# Patient Record
Sex: FEMALE
[De-identification: names, ages, dates, MRNs, and addresses within clinical notes are randomized per-mention and may not be internally consistent; named-entity substitution may affect disease eponyms.]

---

## 2018-07-27 ENCOUNTER — HOSPITAL ENCOUNTER (EMERGENCY)
Dept: HOSPITAL 14 - H.EROB2 | Age: 28
Discharge: HOME | End: 2018-07-27
Payer: MEDICAID

## 2018-07-27 VITALS — BODY MASS INDEX: 24.7 KG/M2

## 2018-07-27 DIAGNOSIS — O47.1: ICD-10-CM

## 2018-07-27 DIAGNOSIS — Z3A.38: ICD-10-CM

## 2018-07-27 DIAGNOSIS — O26.853: Primary | ICD-10-CM

## 2018-07-27 NOTE — OBHP
===========================

Datetime: 2018 19:51

===========================

   

IP Adm Impression:  Term, intrauterine pregnancy

IP Admit Plan:  Observation/Evaluation; Discharge home

Admit Comment, IP Provider:  27 y/o  @ 38.2 weeks GA presents for evaluation of vaginal bleeding.
 Pt had pelvic exam done yesterday at [facility] and has been having spotting since. She reports 
she was told her was 2cm dilated at the clinic yesterday. She reports only seeing scant blood upon wi
ping after urination. Denies CTX, gush of fluid, passing profuse blood/clots, urinary symptoms, and r
eports good DM.

      

   ROS: as per HPI, otherwise negative

      

   PE: as above

   PMD: [facility]

   PObhx: 2 , 1 MC, 1 SAB

   Prenatal course: GBS+, fetal kidney abnormality

   ALL: NKDA

   SurgHx: negative

   SocialHx: denies ETOH/Tobacco/drug abuse

      

   A: 27 y/o  @ 38.2 weeks GA complaining of vaginal spotting

   P: speculum exam no signs of active bleeding, no clots. cervix closed, no CTX, pelvic exam as abov
e, discharge home.

   Case discussed with attending

   Lukas Akbar MD PGY3

      

   The patient was seen with the resident I agree with the note

Pelvic Type - PN:  Adequate

Extremities - PN:  Normal

Abdomen - PN:  Normal

Back - PN:  Normal

Breast - PN:  Normal

Lungs - PN:  Normal

Heart - PN:  Normal

Thyroid - PN:  Normal

Neurologic - PN:  Normal

HEENT - PN:  Normal

General - PN:  Normal

Fetal Presentation-Admit:  Vertex

FHR - Baseline A Provider:  130S

Membranes, Provider:  Intact

Contraction Comments Provider:  N

Comments, ACOG Physical Exam:  GEN: alert oriented, NAD, comfortable

   Skin: no rashes

   CVS: RRR, S1S2, No MRG

   ABD: Soft, NT/ND, +BS

   Pelvic: scant spotting, cervix closed, no clots, 

   2/0/-3, firm, posterior  

      

Vital Signs Provider:  Reviewed

IP Chief Complaint:  Vaginal bleeding

NICHD Variability Prov Fetus A:  Moderate 6-25bpm

NICHD Accel Fetus A IP Provider:  [id number]

FHR Category Provider Fetus A:  Category I

NICHD Decel Fetus A IP Provider:  None

Dilatation, Provider:  2

Effacement, Provider:  0

Station, Provider:  -3

Genitourinary Exam:  Normal

DTRs - PN:  Normal

## 2018-07-28 VITALS
DIASTOLIC BLOOD PRESSURE: 73 MMHG | HEART RATE: 87 BPM | TEMPERATURE: 98.2 F | SYSTOLIC BLOOD PRESSURE: 115 MMHG | OXYGEN SATURATION: 100 %

## 2018-07-29 ENCOUNTER — HOSPITAL ENCOUNTER (INPATIENT)
Dept: HOSPITAL 14 - H.EROB2 | Age: 28
LOS: 2 days | Discharge: HOME | DRG: 373 | End: 2018-07-31
Attending: OBSTETRICS & GYNECOLOGY | Admitting: OBSTETRICS & GYNECOLOGY
Payer: MEDICAID

## 2018-07-29 DIAGNOSIS — Z3A.38: ICD-10-CM

## 2018-07-29 LAB
ERYTHROCYTE [DISTWIDTH] IN BLOOD BY AUTOMATED COUNT: 14.1 % (ref 11.5–14.5)
HGB BLD-MCNC: 14.7 G/DL (ref 12–16)
MCH RBC QN AUTO: 31.1 PG (ref 27–31)
MCHC RBC AUTO-ENTMCNC: 34.7 G/DL (ref 33–37)
MCV RBC AUTO: 89.8 FL (ref 81–99)
PLATELET # BLD: 135 K/UL (ref 130–400)
RBC # BLD AUTO: 4.71 MIL/UL (ref 3.8–5.2)
WBC # BLD AUTO: 9.5 K/UL (ref 4.8–10.8)

## 2018-07-29 PROCEDURE — 0UQGXZZ REPAIR VAGINA, EXTERNAL APPROACH: ICD-10-PCS | Performed by: OBSTETRICS & GYNECOLOGY

## 2018-07-29 PROCEDURE — 4A1HXCZ MONITORING OF PRODUCTS OF CONCEPTION, CARDIAC RATE, EXTERNAL APPROACH: ICD-10-PCS | Performed by: OBSTETRICS & GYNECOLOGY

## 2018-07-29 NOTE — OBDS
===================================

MATERNAL INFORMATION

===================================

   

Provider Comments:   of live male infant over intact perineum, precipitously, ,  VIOLETTE presentat
ion, mouth and nose suctioned, infant placed on mother's chest, cord clamped and cut, cord blood obta
ined, placenta delivered spontaneously, fundus firm, vaginal abrasion repaired with 3-0 vicryl rapide
, EBL=150mL, pt tolerated procedure well

## 2018-07-30 LAB
BASOPHILS # BLD AUTO: 0.1 K/UL (ref 0–0.2)
BASOPHILS NFR BLD: 1.2 % (ref 0–2)
EOSINOPHIL # BLD AUTO: 0.1 K/UL (ref 0–0.7)
EOSINOPHIL NFR BLD: 0.7 % (ref 0–4)
ERYTHROCYTE [DISTWIDTH] IN BLOOD BY AUTOMATED COUNT: 14.2 % (ref 11.5–14.5)
HGB BLD-MCNC: 13.5 G/DL (ref 12–16)
LYMPHOCYTES # BLD AUTO: 1.5 K/UL (ref 1–4.3)
LYMPHOCYTES NFR BLD AUTO: 13.3 % (ref 20–40)
MCH RBC QN AUTO: 31.2 PG (ref 27–31)
MCHC RBC AUTO-ENTMCNC: 34.5 G/DL (ref 33–37)
MCV RBC AUTO: 90.3 FL (ref 81–99)
MONOCYTES # BLD: 0.8 K/UL (ref 0–0.8)
MONOCYTES NFR BLD: 6.6 % (ref 0–10)
NEUTROPHILS # BLD: 9 K/UL (ref 1.8–7)
NEUTROPHILS NFR BLD AUTO: 78.2 % (ref 50–75)
NRBC BLD AUTO-RTO: 0 % (ref 0–0)
PLATELET # BLD: 126 K/UL (ref 130–400)
PMV BLD AUTO: 10.5 FL (ref 7.2–11.7)
RBC # BLD AUTO: 4.32 MIL/UL (ref 3.8–5.2)
WBC # BLD AUTO: 11.5 K/UL (ref 4.8–10.8)

## 2018-07-30 NOTE — OBPPN
===========================

Datetime: 2018 07:16

===========================

   

PP Pain Prov:  Within normal limits

PP Nausea Prov:  Denies

PP Flatus Prov:  Yes

PP BM Prov:  Yes

PP Breasts Prov:  Normal

PP Heart Prov:  Normal

PP Lungs Prov:  Normal

PP Abdomen/Uterus Prov:  Normal

PP Lochia Prov:  Normal

PP CVA Tenderness Prov:  Normal

PP Extremities Prov:  Normal

PP C/S Incision Prov:  Not Applicable

PP Breastfeeding Progress Prov:  Normal

PP Impression Prov:  Normal postpartum progression

PP Plan Prov:  Continue present management

PP Progress Note Prov:  S: Pt is a 29 yo  38.4 wk s/p  Precipitous  on 18 PPD 1, pt w
as GBS + didnt receive meds in time . Seen and examined at bedside this am.  Patient denies any si
gnificant overnight events. Reports mild pelvic pain which is controlled with pain medicine. OOB/Ambu
lation well without dizziness. Breast feeding without difficulty.  Tolerating regular diet. Lochia is
 similar to menses. Voiding freely with no blood noted, Patient has not had a bowel movement, but is 
passing gas per rectum. Denies fever/chills, diarrhea, nausea/vomiting, CP/SOB , Lightheadedness.  

      

   O: BP:115/71, HR:74, T97.9F 

   CBC-14.7/42.3, blood type: O+, rubella: Immune 

      

   PHYSICAL EXAM:   

   GEN: AAOx3, Resting comfortably in bed, NAD   

   HEENT: NCAT, White sclera, pink conjunctiva, oral mucosa moist.    

   LUNGS: CTA B/L, no wheezing, rhonchi, or rales, B/L chest rise  

   CVS: RRR, S1, S2, No murmurs, rubs, gallops   

   ABD: ND, +BS, firm fundus @ umbilical level. Soft, appropriate TTP 

   EXT: no edema, negative Clifford's sign, calves non tender 

   NEURO/Psych: no gross focal deficit, preserved affect and mood.    

      

   A/P  

   27y/o  post partum, had a  on 18 @ 10:44am. Pt afebrile, tolerating pain with medi
cation, tolerating regular diet, adequate urine output.  

      

   Encouraged  breast feeding and ambulation   

   Ibuprofen 600mg mild pain 

   PNV 1 tab po daily 

   Post op contraception -unsure 

   F/U 4-6 weeks for post-partum visit at [facility] M[facility] PGY-1  

   Patient was seen and case discussed with Dr. Kumar  

   [facility] addendum:

   pt seen _ exmined by me. agree w/ above with following addition-+bm after being seen by dr gabe alamo and prior to me.  no c/o

      

      

IP PP Procedures:  None

Vital Signs Provider PP:  Reviewed; Within Normal Limits

## 2018-07-31 VITALS
SYSTOLIC BLOOD PRESSURE: 108 MMHG | RESPIRATION RATE: 18 BRPM | DIASTOLIC BLOOD PRESSURE: 70 MMHG | TEMPERATURE: 98.4 F | HEART RATE: 77 BPM | OXYGEN SATURATION: 99 %

## 2018-07-31 NOTE — OBPPN
===========================

Datetime: 2018 07:14

===========================

   

PP Pain Prov:  Within normal limits

PP Abdomen/Uterus Prov:  Normal

PP Lochia Prov:  Normal

PP Extremities Prov:  Normal

PP Breastfeeding Progress Prov:  Normal

PP Impression Prov:  Normal postpartum progression

PP Plan Prov:  Discharge

PP Progress Note Prov:  PPD 2 s/p , doing well, breast feeding 

   Discharge home today  

Vital Signs Provider PP:  Reviewed; Within Normal Limits